# Patient Record
Sex: MALE | NOT HISPANIC OR LATINO | RURAL
[De-identification: names, ages, dates, MRNs, and addresses within clinical notes are randomized per-mention and may not be internally consistent; named-entity substitution may affect disease eponyms.]

---

## 2022-04-05 ENCOUNTER — OFFICE VISIT (OUTPATIENT)
Dept: CARDIOLOGY | Facility: CLINIC | Age: 77
End: 2022-04-05

## 2022-04-05 VITALS
HEIGHT: 71 IN | BODY MASS INDEX: 21.28 KG/M2 | SYSTOLIC BLOOD PRESSURE: 182 MMHG | OXYGEN SATURATION: 98 % | HEART RATE: 65 BPM | WEIGHT: 152 LBS | DIASTOLIC BLOOD PRESSURE: 81 MMHG

## 2022-04-05 DIAGNOSIS — I10 ESSENTIAL HYPERTENSION: ICD-10-CM

## 2022-04-05 DIAGNOSIS — E78.5 DYSLIPIDEMIA: ICD-10-CM

## 2022-04-05 DIAGNOSIS — I25.700 CORONARY ARTERY DISEASE INVOLVING CORONARY BYPASS GRAFT OF NATIVE HEART WITH UNSTABLE ANGINA PECTORIS: Primary | ICD-10-CM

## 2022-04-05 PROCEDURE — 99204 OFFICE O/P NEW MOD 45 MIN: CPT | Performed by: INTERNAL MEDICINE

## 2022-04-05 PROCEDURE — 93000 ELECTROCARDIOGRAM COMPLETE: CPT | Performed by: INTERNAL MEDICINE

## 2022-04-05 RX ORDER — ISOSORBIDE MONONITRATE 30 MG/1
30 TABLET, EXTENDED RELEASE ORAL DAILY
COMMUNITY

## 2022-04-05 RX ORDER — AMLODIPINE BESYLATE 5 MG/1
5 TABLET ORAL DAILY
Qty: 90 TABLET | Refills: 3 | Status: SHIPPED | OUTPATIENT
Start: 2022-04-05 | End: 2022-09-13 | Stop reason: SDUPTHER

## 2022-04-05 RX ORDER — ASPIRIN 81 MG/1
81 TABLET ORAL DAILY
COMMUNITY

## 2022-04-05 NOTE — H&P (VIEW-ONLY)
[facility]    Encounter Date: 2022    Patient ID: Star Izquierdo is a 76 y.o. male.  : 1945     PCP: Provider, No Known       Chief Complaint: Chest Pain and Shortness of Breath      PROBLEM LIST:  1. Coronary artery disease  a. CABG x1, 2012: LIMA to LAD.   b. Stress echo, 2018 ([facility]): EF 55%. Stress ECG consistent with myocardial ischemia. Max exercise 9 min, max ST deviation 2 mm, angina present but did not limit exercise. Peak stress - hypokinesis of inferior and inferoseptal LV myocardium.   c. LHC, 2018 ([facility]): EF 60%. Two-vessel obstructive CAD of the LAD. PCI of OM2 with Synergy DEISY 2.25 x 32 mm from the proximal vessel into the secondary B branch, crossing secondary A branch. Continued patency of the LIMA-LAD graft. Proximal LAD ostial 80% heavily calcified on a moderate bend, before the diagonal, which has focal 90% stenosis, small caliber 2 mm. Middle LAD with 70% diffuse disease with competitive flow from patent LIMA graft. Middle RCA with previously placed stents mild diffuse disease, with focal area up to 50%, FFR 0.84.  LVEDP 11 mmHg. Normal valve function.   2. Hypertension  3. Hyperlipidemia  4. History of tobacco abuse  5. GERD  6. Prostate cancer status post prostatectomy  7. Surgical history  a. CABG  b. Proctectomy  c. Appendectomy 2021  History of Present Illness: Star Izquierdo is a 76 y.o. male who has a history of coronary artery disease he is s/p CABG WITH BRINK to LAD () and PCI with OM2 stent (), at the time of PCI his LIMA graft was patent.  Additionally has a history of hypertension, hyperlipidemia, and GERD.  The patient presents to the cardiology office today with complaint of progressive exertional chest pain/pressure for the past three months. At the time of his last heart catheterization in 2018 he was informed he had an additional blockage that would be treated medically. He is not  "currently on any antihypertensives or antianginal and says his previous cardiologist discontinued them. He hasn't tolerated cholesterol medication in the past due to myalgias and is not on anything.  States that he has taken \"almost all of them\".  He reports a week ago he abruptly woke up from sleep seeing bright white lights but did not experience any chest pain or palpitations.. He stopped smoking tobacco in . He denies history of diabetes mellitus. Patient denies shortness of breath, palpitations, edema, dizziness, and syncope.     Past Medical History:   Past Medical History:   Diagnosis Date   • Acid reflux    • Anxiety    • CHF (congestive heart failure) (HCC)    • Depression    • Myocardial infarction (HCC)    • Prostate cancer (HCC)        Past Surgical History:   Past Surgical History:   Procedure Laterality Date   • APPENDECTOMY  2021   • HERNIA REPAIR     • PROSTATE SURGERY         Family History:   Family History   Problem Relation Age of Onset   • Stroke Mother    • No Known Problems Sister    • No Known Problems Sister    • No Known Problems Brother        Social History:   Social History     Socioeconomic History   • Marital status: Unknown   Tobacco Use   • Smoking status: Former Smoker     Quit date:      Years since quittin.2   • Smokeless tobacco: Never Used   Vaping Use   • Vaping Use: Never used   Substance and Sexual Activity   • Alcohol use: Never   • Drug use: Never   • Sexual activity: Defer       Tobacco History:   Social History     Tobacco Use   Smoking Status Former Smoker   • Quit date:    • Years since quittin.2   Smokeless Tobacco Never Used       Medications:     Current Outpatient Medications:   •  aspirin 81 MG EC tablet, Take 81 mg by mouth Daily., Disp: , Rfl:   •  Calcium Carbonate-Vitamin D (calcium-vitamin D) 500-200 MG-UNIT tablet per tablet, Take 1 tablet by mouth Daily., Disp: , Rfl:   •  cyanocobalamin (VITAMIN B-12) 1000 MCG tablet, Take 1,000 mcg " "by mouth Daily., Disp: , Rfl:   •  isosorbide mononitrate (IMDUR) 30 MG 24 hr tablet, Take 30 mg by mouth Daily., Disp: , Rfl:   •  amLODIPine (NORVASC) 5 MG tablet, Take 1 tablet by mouth Daily., Disp: 90 tablet, Rfl: 3    Allergies:   Allergies   Allergen Reactions   • Penicillins Hives     The following portions of the patient's history were reviewed and updated as appropriate: allergies, current medications, past family history, past medical history, past social history, past surgical history and problem list.    Review of Systems   Constitution: Negative for chills, fever, fatigue, generalized weakness.   Cardiovascular: Negative for dyspnea on exertion, leg swelling, palpitations, orthopnea, and syncope. Positive for chest pain  Respiratory: Negative for cough, shortness of breath, and wheezing.  HENT: Negative for ear pain, nosebleeds, and tinnitus.  Gastrointestinal: Negative for abdominal pain, constipation, diarrhea, nausea and vomiting.   Genitourinary: No urinary symptoms.  Musculoskeletal: Negative for muscle cramps.  Neurological: Negative for dizziness, headaches, loss of balance, numbness, and symptoms of stroke.  Psychiatric: Normal mental status.     All other systems reviewed and are negative.        Objective:     BP (!) 182/81 (BP Location: Right arm, Patient Position: Sitting)   Pulse 65   Ht 180.3 cm (71\")   Wt 68.9 kg (152 lb)   SpO2 98%   BMI 21.20 kg/m²    BP repeat: 160/90    Physical Exam  Constitutional: Patient appears well-developed and well-nourished.   HENT: HEENT exam unremarkable.   Neck: Neck supple. No JVD present. No carotid bruits.   Cardiovascular: Normal rate, regular rhythm and normal heart sounds. No murmur heard.   2+ symmetric pulses.   Pulmonary/Chest: Breath sounds normal. Does not exhibit tenderness.   Abdominal: Abdomen benign.   Musculoskeletal: Does not exhibit edema.   Neurological: Neurological exam unremarkable.   Vitals reviewed.    Data Review:   Lab " Review 2/2022  FLP: , , HDL 38,   CMP: Glu 97, BUN 19, Creat 1.01, eGFR 71, Na 141, K 4.5, Cl 106, CO2 27, Ca 9.4, Alk Phos 110, AST 25, ALT 14   CBC: WBC 7.6, RBC 6.25, HGB 12.5, HCT 38.1, MCV 89.6, MCH 29.5,         ECG 12 Lead    Date/Time: 4/5/2022 2:46 PM  Performed by: Esteban Russell MD  Authorized by: Esteban Russell MD   Comparison: not compared with previous ECG   Rhythm: sinus rhythm  BPM: 65  Other findings comments: moderate voltage for LVH    Clinical impression: abnormal EKG               Assessment:      Diagnosis Plan   1. Coronary artery disease involving coronary bypass graft of native heart with unstable angina pectoris (HCC)   due to progressive angina with features of unstable angina in the setting of known CAD and unmodified risk factors there is significant risk of disease progression.  I have recommended cardiac catheterization study for further assessment.  This is to be followed by catheter-based intervention depending on findings.  The procedure will be scheduled in the near future.  Continue aspirin and Imdur for now, further add amlodipine as an additional antianginal.   2. Essential hypertension  Inadequate control; begin amlodipine 5 mg daily   3. Dyslipidemia  Inadequate control, last  but will readdress after LHC and may consider next visit or PCSK9 inhibitor.     Plan:   LHC to be scheduled for progressive/unstable angina type symptoms.      Begin amlodipine 5 mg daily for improved control of blood pressure and angina, continue aspirin and Imdur.   Will readdress dyslipidemia after heart cath and consider starting next visit of PCSK9 inhibitor.   Continue to stop the current medications.   FU after procedure, sooner as needed.  Thank you for allowing us to participate in the care of your patient.     Scribed for Esteban Russell MD by Freida Healy. 4/7/2022 14:50 EDT    I, Esteban Russell MD, personally performed the services described in this documentation  as scribed by the above named individual in my presence, and it is both accurate and complete.  4/7/2022  07:48 EDT    Part of this note may be an electronic transcription/translation of spoken language to printed text using the Dragon Dictation System.

## 2022-04-05 NOTE — PROGRESS NOTES
[facility] Card[facility]    Encounter Date: 2022    Patient ID: Star Izquierdo is a 76 y.o. male.  : 1945     PCP: Provider, No Known       Chief Complaint: Chest Pain and Shortness of Breath      PROBLEM LIST:  1. Coronary artery disease  a. CABG x1, 2012: LIMA to LAD.   b. Stress echo, 2018 ([facility]): EF 55%. Stress ECG consistent with myocardial ischemia. Max exercise 9 min, max ST deviation 2 mm, angina present but did not limit exercise. Peak stress - hypokinesis of inferior and inferoseptal LV myocardium.   c. LHC, 2018 ([facility]): EF 60%. Two-vessel obstructive CAD of the LAD. PCI of OM2 with Synergy DEISY 2.25 x 32 mm from the proximal vessel into the secondary B branch, crossing secondary A branch. Continued patency of the LIMA-LAD graft. Proximal LAD ostial 80% heavily calcified on a moderate bend, before the diagonal, which has focal 90% stenosis, small caliber 2 mm. Middle LAD with 70% diffuse disease with competitive flow from patent LIMA graft. Middle RCA with previously placed stents mild diffuse disease, with focal area up to 50%, FFR 0.84.  LVEDP 11 mmHg. Normal valve function.   2. Hypertension  3. Hyperlipidemia  4. History of tobacco abuse  5. GERD  6. Prostate cancer status post prostatectomy  7. Surgical history  a. CABG  b. Proctectomy  c. Appendectomy 2021  History of Present Illness: Star Izquierdo is a 76 y.o. male who has a history of coronary artery disease he is s/p CABG WITH BRINK to LAD () and PCI with OM2 stent (), at the time of PCI his LIMA graft was patent.  Additionally has a history of hypertension, hyperlipidemia, and GERD.  The patient presents to the cardiology office today with complaint of progressive exertional chest pain/pressure for the past three months. At the time of his last heart catheterization in 2018 he was informed he had an additional blockage that would be treated medically. He is not  "currently on any antihypertensives or antianginal and says his previous cardiologist discontinued them. He hasn't tolerated cholesterol medication in the past due to myalgias and is not on anything.  States that he has taken \"almost all of them\".  He reports a week ago he abruptly woke up from sleep seeing bright white lights but did not experience any chest pain or palpitations.. He stopped smoking tobacco in . He denies history of diabetes mellitus. Patient denies shortness of breath, palpitations, edema, dizziness, and syncope.     Past Medical History:   Past Medical History:   Diagnosis Date   • Acid reflux    • Anxiety    • CHF (congestive heart failure) (HCC)    • Depression    • Myocardial infarction (HCC)    • Prostate cancer (HCC)        Past Surgical History:   Past Surgical History:   Procedure Laterality Date   • APPENDECTOMY  2021   • HERNIA REPAIR     • PROSTATE SURGERY         Family History:   Family History   Problem Relation Age of Onset   • Stroke Mother    • No Known Problems Sister    • No Known Problems Sister    • No Known Problems Brother        Social History:   Social History     Socioeconomic History   • Marital status: Unknown   Tobacco Use   • Smoking status: Former Smoker     Quit date:      Years since quittin.2   • Smokeless tobacco: Never Used   Vaping Use   • Vaping Use: Never used   Substance and Sexual Activity   • Alcohol use: Never   • Drug use: Never   • Sexual activity: Defer       Tobacco History:   Social History     Tobacco Use   Smoking Status Former Smoker   • Quit date:    • Years since quittin.2   Smokeless Tobacco Never Used       Medications:     Current Outpatient Medications:   •  aspirin 81 MG EC tablet, Take 81 mg by mouth Daily., Disp: , Rfl:   •  Calcium Carbonate-Vitamin D (calcium-vitamin D) 500-200 MG-UNIT tablet per tablet, Take 1 tablet by mouth Daily., Disp: , Rfl:   •  cyanocobalamin (VITAMIN B-12) 1000 MCG tablet, Take 1,000 mcg " "by mouth Daily., Disp: , Rfl:   •  isosorbide mononitrate (IMDUR) 30 MG 24 hr tablet, Take 30 mg by mouth Daily., Disp: , Rfl:   •  amLODIPine (NORVASC) 5 MG tablet, Take 1 tablet by mouth Daily., Disp: 90 tablet, Rfl: 3    Allergies:   Allergies   Allergen Reactions   • Penicillins Hives     The following portions of the patient's history were reviewed and updated as appropriate: allergies, current medications, past family history, past medical history, past social history, past surgical history and problem list.    Review of Systems   Constitution: Negative for chills, fever, fatigue, generalized weakness.   Cardiovascular: Negative for dyspnea on exertion, leg swelling, palpitations, orthopnea, and syncope. Positive for chest pain  Respiratory: Negative for cough, shortness of breath, and wheezing.  HENT: Negative for ear pain, nosebleeds, and tinnitus.  Gastrointestinal: Negative for abdominal pain, constipation, diarrhea, nausea and vomiting.   Genitourinary: No urinary symptoms.  Musculoskeletal: Negative for muscle cramps.  Neurological: Negative for dizziness, headaches, loss of balance, numbness, and symptoms of stroke.  Psychiatric: Normal mental status.     All other systems reviewed and are negative.        Objective:     BP (!) 182/81 (BP Location: Right arm, Patient Position: Sitting)   Pulse 65   Ht 180.3 cm (71\")   Wt 68.9 kg (152 lb)   SpO2 98%   BMI 21.20 kg/m²    BP repeat: 160/90    Physical Exam  Constitutional: Patient appears well-developed and well-nourished.   HENT: HEENT exam unremarkable.   Neck: Neck supple. No JVD present. No carotid bruits.   Cardiovascular: Normal rate, regular rhythm and normal heart sounds. No murmur heard.   2+ symmetric pulses.   Pulmonary/Chest: Breath sounds normal. Does not exhibit tenderness.   Abdominal: Abdomen benign.   Musculoskeletal: Does not exhibit edema.   Neurological: Neurological exam unremarkable.   Vitals reviewed.    Data Review:   Lab " Review 2/2022  FLP: , , HDL 38,   CMP: Glu 97, BUN 19, Creat 1.01, eGFR 71, Na 141, K 4.5, Cl 106, CO2 27, Ca 9.4, Alk Phos 110, AST 25, ALT 14   CBC: WBC 7.6, RBC 6.25, HGB 12.5, HCT 38.1, MCV 89.6, MCH 29.5,         ECG 12 Lead    Date/Time: 4/5/2022 2:46 PM  Performed by: Esteban Russell MD  Authorized by: Esteban Russell MD   Comparison: not compared with previous ECG   Rhythm: sinus rhythm  BPM: 65  Other findings comments: moderate voltage for LVH    Clinical impression: abnormal EKG               Assessment:      Diagnosis Plan   1. Coronary artery disease involving coronary bypass graft of native heart with unstable angina pectoris (HCC)   due to progressive angina with features of unstable angina in the setting of known CAD and unmodified risk factors there is significant risk of disease progression.  I have recommended cardiac catheterization study for further assessment.  This is to be followed by catheter-based intervention depending on findings.  The procedure will be scheduled in the near future.  Continue aspirin and Imdur for now, further add amlodipine as an additional antianginal.   2. Essential hypertension  Inadequate control; begin amlodipine 5 mg daily   3. Dyslipidemia  Inadequate control, last  but will readdress after LHC and may consider next visit or PCSK9 inhibitor.     Plan:   LHC to be scheduled for progressive/unstable angina type symptoms.      Begin amlodipine 5 mg daily for improved control of blood pressure and angina, continue aspirin and Imdur.   Will readdress dyslipidemia after heart cath and consider starting next visit of PCSK9 inhibitor.   Continue to stop the current medications.   FU after procedure, sooner as needed.  Thank you for allowing us to participate in the care of your patient.     Scribed for Esteban Russell MD by Freida Healy. 4/7/2022 14:50 EDT    I, Esteban Russell MD, personally performed the services described in this documentation  as scribed by the above named individual in my presence, and it is both accurate and complete.  4/7/2022  07:48 EDT    Part of this note may be an electronic transcription/translation of spoken language to printed text using the Dragon Dictation System.

## 2022-04-07 PROBLEM — I10 ESSENTIAL HYPERTENSION: Status: ACTIVE | Noted: 2022-04-07

## 2022-04-07 PROBLEM — I25.700 CORONARY ARTERY DISEASE INVOLVING CORONARY BYPASS GRAFT OF NATIVE HEART WITH UNSTABLE ANGINA PECTORIS: Status: ACTIVE | Noted: 2022-04-07

## 2022-04-07 PROBLEM — E78.5 DYSLIPIDEMIA: Status: ACTIVE | Noted: 2022-04-07

## 2022-04-11 ENCOUNTER — PREP FOR SURGERY (OUTPATIENT)
Dept: OTHER | Facility: HOSPITAL | Age: 77
End: 2022-04-11

## 2022-04-11 RX ORDER — SODIUM CHLORIDE 0.9 % (FLUSH) 0.9 %
10 SYRINGE (ML) INJECTION EVERY 12 HOURS SCHEDULED
Status: CANCELLED | OUTPATIENT
Start: 2022-04-11

## 2022-04-11 RX ORDER — SODIUM CHLORIDE 0.9 % (FLUSH) 0.9 %
10 SYRINGE (ML) INJECTION AS NEEDED
Status: CANCELLED | OUTPATIENT
Start: 2022-04-11

## 2022-04-11 RX ORDER — ASPIRIN 81 MG/1
324 TABLET, CHEWABLE ORAL ONCE
Status: CANCELLED | OUTPATIENT
Start: 2022-04-11 | End: 2022-04-11

## 2022-04-11 RX ORDER — ASPIRIN 81 MG/1
81 TABLET ORAL DAILY
Status: CANCELLED | OUTPATIENT
Start: 2022-04-12

## 2022-04-13 ENCOUNTER — HOSPITAL ENCOUNTER (OUTPATIENT)
Facility: HOSPITAL | Age: 77
Setting detail: HOSPITAL OUTPATIENT SURGERY
Discharge: HOME | End: 2022-04-13
Attending: INTERNAL MEDICINE | Admitting: INTERNAL MEDICINE

## 2022-04-13 VITALS
HEIGHT: 71 IN | RESPIRATION RATE: 16 BRPM | DIASTOLIC BLOOD PRESSURE: 93 MMHG | HEART RATE: 59 BPM | BODY MASS INDEX: 21.3 KG/M2 | OXYGEN SATURATION: 97 % | SYSTOLIC BLOOD PRESSURE: 137 MMHG | TEMPERATURE: 97.4 F | WEIGHT: 152.12 LBS

## 2022-04-13 DIAGNOSIS — I25.700 CORONARY ARTERY DISEASE INVOLVING CORONARY BYPASS GRAFT OF NATIVE HEART WITH UNSTABLE ANGINA PECTORIS: ICD-10-CM

## 2022-04-13 DIAGNOSIS — I10 ESSENTIAL HYPERTENSION: ICD-10-CM

## 2022-04-13 DIAGNOSIS — E78.5 DYSLIPIDEMIA: ICD-10-CM

## 2022-04-13 LAB
ACT BLD: 267 SECONDS (ref 82–152)
ALBUMIN SERPL-MCNC: 4 G/DL (ref 3.5–5.2)
ALBUMIN/GLOB SERPL: 1.3 G/DL
ALP SERPL-CCNC: 103 U/L (ref 39–117)
ALT SERPL W P-5'-P-CCNC: 7 U/L (ref 1–41)
ANION GAP SERPL CALCULATED.3IONS-SCNC: 10 MMOL/L (ref 5–15)
AST SERPL-CCNC: 14 U/L (ref 1–40)
BILIRUB SERPL-MCNC: 0.4 MG/DL (ref 0–1.2)
BUN SERPL-MCNC: 12 MG/DL (ref 8–23)
BUN/CREAT SERPL: 10.8 (ref 7–25)
CALCIUM SPEC-SCNC: 8.8 MG/DL (ref 8.6–10.5)
CHLORIDE SERPL-SCNC: 103 MMOL/L (ref 98–107)
CHOLEST SERPL-MCNC: 175 MG/DL (ref 0–200)
CO2 SERPL-SCNC: 26 MMOL/L (ref 22–29)
CREAT BLDA-MCNC: 1.2 MG/DL (ref 0.6–1.3)
CREAT SERPL-MCNC: 1.11 MG/DL (ref 0.76–1.27)
DEPRECATED RDW RBC AUTO: 44.6 FL (ref 37–54)
EGFRCR SERPLBLD CKD-EPI 2021: 68 ML/MIN/1.73
ERYTHROCYTE [DISTWIDTH] IN BLOOD BY AUTOMATED COUNT: 13.3 % (ref 12.3–15.4)
GLOBULIN UR ELPH-MCNC: 3 GM/DL
GLUCOSE SERPL-MCNC: 99 MG/DL (ref 65–99)
HBA1C MFR BLD: 5.7 % (ref 4.8–5.6)
HCT VFR BLD AUTO: 39.1 % (ref 37.5–51)
HDLC SERPL-MCNC: 48 MG/DL (ref 40–60)
HGB BLD-MCNC: 12.4 G/DL (ref 13–17.7)
LDLC SERPL CALC-MCNC: 108 MG/DL (ref 0–100)
LDLC/HDLC SERPL: 2.21 {RATIO}
MCH RBC QN AUTO: 29.5 PG (ref 26.6–33)
MCHC RBC AUTO-ENTMCNC: 31.7 G/DL (ref 31.5–35.7)
MCV RBC AUTO: 92.9 FL (ref 79–97)
PLATELET # BLD AUTO: 291 10*3/MM3 (ref 140–450)
PMV BLD AUTO: 9.8 FL (ref 6–12)
POTASSIUM SERPL-SCNC: 3.8 MMOL/L (ref 3.5–5.2)
PROT SERPL-MCNC: 7 G/DL (ref 6–8.5)
RBC # BLD AUTO: 4.21 10*6/MM3 (ref 4.14–5.8)
SODIUM SERPL-SCNC: 139 MMOL/L (ref 136–145)
TRIGL SERPL-MCNC: 105 MG/DL (ref 0–150)
VLDLC SERPL-MCNC: 19 MG/DL (ref 5–40)
WBC NRBC COR # BLD: 7.3 10*3/MM3 (ref 3.4–10.8)

## 2022-04-13 PROCEDURE — 93458 L HRT ARTERY/VENTRICLE ANGIO: CPT | Performed by: INTERNAL MEDICINE

## 2022-04-13 PROCEDURE — 25010000002 HEPARIN (PORCINE) PER 1000 UNITS: Performed by: INTERNAL MEDICINE

## 2022-04-13 PROCEDURE — 25010000002 FENTANYL CITRATE (PF) 50 MCG/ML SOLUTION: Performed by: INTERNAL MEDICINE

## 2022-04-13 PROCEDURE — C1874 STENT, COATED/COV W/DEL SYS: HCPCS | Performed by: INTERNAL MEDICINE

## 2022-04-13 PROCEDURE — 82565 ASSAY OF CREATININE: CPT

## 2022-04-13 PROCEDURE — 83036 HEMOGLOBIN GLYCOSYLATED A1C: CPT

## 2022-04-13 PROCEDURE — 93005 ELECTROCARDIOGRAM TRACING: CPT | Performed by: INTERNAL MEDICINE

## 2022-04-13 PROCEDURE — C1894 INTRO/SHEATH, NON-LASER: HCPCS | Performed by: INTERNAL MEDICINE

## 2022-04-13 PROCEDURE — C9600 PERC DRUG-EL COR STENT SING: HCPCS | Performed by: INTERNAL MEDICINE

## 2022-04-13 PROCEDURE — 80061 LIPID PANEL: CPT

## 2022-04-13 PROCEDURE — C1725 CATH, TRANSLUMIN NON-LASER: HCPCS | Performed by: INTERNAL MEDICINE

## 2022-04-13 PROCEDURE — C1769 GUIDE WIRE: HCPCS | Performed by: INTERNAL MEDICINE

## 2022-04-13 PROCEDURE — 85347 COAGULATION TIME ACTIVATED: CPT

## 2022-04-13 PROCEDURE — 25010000002 MIDAZOLAM PER 1 MG: Performed by: INTERNAL MEDICINE

## 2022-04-13 PROCEDURE — C1887 CATHETER, GUIDING: HCPCS | Performed by: INTERNAL MEDICINE

## 2022-04-13 PROCEDURE — 92928 PRQ TCAT PLMT NTRAC ST 1 LES: CPT | Performed by: INTERNAL MEDICINE

## 2022-04-13 PROCEDURE — 0 IOPAMIDOL PER 1 ML: Performed by: INTERNAL MEDICINE

## 2022-04-13 PROCEDURE — 85027 COMPLETE CBC AUTOMATED: CPT

## 2022-04-13 PROCEDURE — 80053 COMPREHEN METABOLIC PANEL: CPT

## 2022-04-13 PROCEDURE — 93459 L HRT ART/GRFT ANGIO: CPT | Performed by: INTERNAL MEDICINE

## 2022-04-13 DEVICE — XIENCE SKYPOINT™ EVEROLIMUS ELUTING CORONARY STENT SYSTEM 3.00 MM X 38 MM / RAPID-EXCHANGE
Type: IMPLANTABLE DEVICE | Status: FUNCTIONAL
Brand: XIENCE SKYPOINT™

## 2022-04-13 RX ORDER — ASPIRIN 81 MG/1
81 TABLET ORAL DAILY
Status: DISCONTINUED | OUTPATIENT
Start: 2022-04-14 | End: 2022-04-13 | Stop reason: HOSPADM

## 2022-04-13 RX ORDER — SODIUM CHLORIDE 0.9 % (FLUSH) 0.9 %
10 SYRINGE (ML) INJECTION AS NEEDED
Status: DISCONTINUED | OUTPATIENT
Start: 2022-04-13 | End: 2022-04-13 | Stop reason: HOSPADM

## 2022-04-13 RX ORDER — CLOPIDOGREL BISULFATE 75 MG/1
75 TABLET ORAL DAILY
Qty: 90 TABLET | Refills: 3 | Status: SHIPPED | OUTPATIENT
Start: 2022-04-13

## 2022-04-13 RX ORDER — CLOPIDOGREL BISULFATE 75 MG/1
TABLET ORAL AS NEEDED
Status: DISCONTINUED | OUTPATIENT
Start: 2022-04-13 | End: 2022-04-13 | Stop reason: HOSPADM

## 2022-04-13 RX ORDER — SODIUM CHLORIDE 0.9 % (FLUSH) 0.9 %
10 SYRINGE (ML) INJECTION EVERY 12 HOURS SCHEDULED
Status: DISCONTINUED | OUTPATIENT
Start: 2022-04-13 | End: 2022-04-13 | Stop reason: HOSPADM

## 2022-04-13 RX ORDER — SODIUM CHLORIDE 9 MG/ML
3 INJECTION, SOLUTION INTRAVENOUS CONTINUOUS
Status: ACTIVE | OUTPATIENT
Start: 2022-04-13 | End: 2022-04-13

## 2022-04-13 RX ORDER — CLOPIDOGREL BISULFATE 75 MG/1
75 TABLET ORAL DAILY
Qty: 90 TABLET | Refills: 3 | Status: SHIPPED | OUTPATIENT
Start: 2022-04-13 | End: 2022-04-13 | Stop reason: SDUPTHER

## 2022-04-13 RX ORDER — ROSUVASTATIN CALCIUM 10 MG/1
10 TABLET, COATED ORAL DAILY
Qty: 90 TABLET | Refills: 3 | Status: SHIPPED | OUTPATIENT
Start: 2022-04-13 | End: 2022-09-13

## 2022-04-13 RX ORDER — ASPIRIN 81 MG/1
324 TABLET, CHEWABLE ORAL ONCE
Status: COMPLETED | OUTPATIENT
Start: 2022-04-13 | End: 2022-04-13

## 2022-04-13 RX ORDER — LIDOCAINE HYDROCHLORIDE 10 MG/ML
INJECTION, SOLUTION EPIDURAL; INFILTRATION; INTRACAUDAL; PERINEURAL AS NEEDED
Status: DISCONTINUED | OUTPATIENT
Start: 2022-04-13 | End: 2022-04-13 | Stop reason: HOSPADM

## 2022-04-13 RX ORDER — FENTANYL CITRATE 50 UG/ML
INJECTION, SOLUTION INTRAMUSCULAR; INTRAVENOUS AS NEEDED
Status: DISCONTINUED | OUTPATIENT
Start: 2022-04-13 | End: 2022-04-13 | Stop reason: HOSPADM

## 2022-04-13 RX ORDER — MIDAZOLAM HYDROCHLORIDE 1 MG/ML
INJECTION INTRAMUSCULAR; INTRAVENOUS AS NEEDED
Status: DISCONTINUED | OUTPATIENT
Start: 2022-04-13 | End: 2022-04-13 | Stop reason: HOSPADM

## 2022-04-13 RX ORDER — SODIUM CHLORIDE 9 MG/ML
100 INJECTION, SOLUTION INTRAVENOUS CONTINUOUS
Status: ACTIVE | OUTPATIENT
Start: 2022-04-13 | End: 2022-04-13

## 2022-04-13 RX ORDER — HEPARIN SODIUM 1000 [USP'U]/ML
INJECTION, SOLUTION INTRAVENOUS; SUBCUTANEOUS AS NEEDED
Status: DISCONTINUED | OUTPATIENT
Start: 2022-04-13 | End: 2022-04-13 | Stop reason: HOSPADM

## 2022-04-13 RX ORDER — ACETAMINOPHEN 325 MG/1
650 TABLET ORAL EVERY 4 HOURS PRN
Status: DISCONTINUED | OUTPATIENT
Start: 2022-04-13 | End: 2022-04-13 | Stop reason: HOSPADM

## 2022-04-13 RX ADMIN — ACETAMINOPHEN 650 MG: 325 TABLET ORAL at 12:05

## 2022-04-13 RX ADMIN — SODIUM CHLORIDE 3 ML/KG/HR: 9 INJECTION, SOLUTION INTRAVENOUS at 08:02

## 2022-04-13 RX ADMIN — ASPIRIN 81 MG 324 MG: 81 TABLET ORAL at 08:01

## 2022-04-13 NOTE — INTERVAL H&P NOTE
H&P reviewed. The patient was examined and there are no changes to the H&P.      Allens Test  Right: Abnormal  Left: Abnormal    Vitals:    04/13/22 0722   BP: 169/72   Pulse: 58     Lab Results   Component Value Date    WBC 7.30 04/13/2022    HGB 12.4 (L) 04/13/2022    HCT 39.1 04/13/2022    MCV 92.9 04/13/2022     04/13/2022     Lab Results   Component Value Date    CREATININE 1.20 04/13/2022       Patient is a 76-year-old male with past medical history significant for coronary artery disease status post CABG in 2012 with PCI in 2018.  Patient has been having progressive exertional chest pain/pressure for the last 3 months and presents today for left heart catheterization.  Left and right Allens tests abnormal.     Jodi Scott PA-C

## 2022-04-14 ENCOUNTER — DOCUMENTATION (OUTPATIENT)
Dept: CARDIAC REHAB | Facility: HOSPITAL | Age: 77
End: 2022-04-14

## 2022-04-14 ENCOUNTER — CALL CENTER PROGRAMS (OUTPATIENT)
Dept: CALL CENTER | Facility: HOSPITAL | Age: 77
End: 2022-04-14

## 2022-04-14 NOTE — OUTREACH NOTE
PCI/Device Survey    Flowsheet Row Responses   Facility patient discharged from? [place]   Procedure date 04/13/22   Procedure (if device, specify in description) PCI   PCI site Left, Arm  [wrist]   Performing MD Dr. Esteban Russell   Attempt successful? Yes   Call start time [id number]   Call end time 0854   Has the patient had any of the following symptoms since discharge? --  [None noted]   Is the patient taking prescribed medications: Plavix, ASA   Does the patient have any of the following symptoms related to the cath/surgical site? Bruising   Does the patient have an appointment scheduled with the cardiologist? Yes   Appointment comments Appt with Dr. Russell is on 6/7/22   If the patient is a current smoker, are they able to teach back resources for cessation? Not a smoker   Did the patient feel prepared to go home on the same day as the procedure? Yes   Is the patient satisfied with the same day discharge process? Yes   PCI/Device call completed Yes          SUSI CRAMER - Registered Nurse

## 2022-04-14 NOTE — PROGRESS NOTES
Cardiac Rehab staff mailed referral letter to patient regarding Phase II Cardiac Rehab program. Instruction for patient to contact [facility] Cardiac Rehab Department for additional program information and to forward referral to closest facility.

## 2022-06-05 LAB
QT INTERVAL: 416 MS
QTC INTERVAL: 429 MS

## 2022-09-13 ENCOUNTER — OFFICE VISIT (OUTPATIENT)
Dept: CARDIOLOGY | Facility: CLINIC | Age: 77
End: 2022-09-13

## 2022-09-13 VITALS
WEIGHT: 149 LBS | HEIGHT: 71 IN | OXYGEN SATURATION: 97 % | HEART RATE: 67 BPM | BODY MASS INDEX: 20.86 KG/M2 | DIASTOLIC BLOOD PRESSURE: 85 MMHG | SYSTOLIC BLOOD PRESSURE: 181 MMHG

## 2022-09-13 DIAGNOSIS — I25.810 CORONARY ARTERY DISEASE INVOLVING CORONARY BYPASS GRAFT OF NATIVE HEART WITHOUT ANGINA PECTORIS: Primary | ICD-10-CM

## 2022-09-13 DIAGNOSIS — I10 ESSENTIAL HYPERTENSION: ICD-10-CM

## 2022-09-13 DIAGNOSIS — E78.5 DYSLIPIDEMIA: ICD-10-CM

## 2022-09-13 PROCEDURE — 99214 OFFICE O/P EST MOD 30 MIN: CPT | Performed by: INTERNAL MEDICINE

## 2022-09-13 RX ORDER — AMLODIPINE BESYLATE 10 MG/1
10 TABLET ORAL DAILY
Qty: 90 TABLET | Refills: 3 | Status: SHIPPED | OUTPATIENT
Start: 2022-09-13

## 2022-09-13 NOTE — PROGRESS NOTES
[facility] Cardiology    Encounter Date: 2022    Patient ID: Star Izquierdo is a 76 y.o. male.  : 1945     PCP: Provider, No Known       Chief Complaint: Coronary Artery Disease      PROBLEM LIST:  1. Coronary artery disease  a. CABG x1, : LIMA to LAD.   b. Stress echo, 2018 ([facility]): EF 55%. Stress ECG consistent with myocardial ischemia. Max exercise 9 min, max ST deviation 2 mm, angina present but did not limit exercise. Peak stress - hypokinesis of inferior and inferoseptal LV myocardium.   c. [facility], 2018 ([facility]): EF 60%. Two-vessel obstructive CAD of the LAD. PCI of OM2 with Synergy DEISY 2.25 x 32 mm from the proximal vessel into the secondary B branch, crossing secondary A branch. Continued patency of the LIMA-LAD graft. Proximal LAD ostial 80% heavily calcified on a moderate bend, before the diagonal, which has focal 90% stenosis, small caliber 2 mm. Middle LAD with 70% diffuse disease with competitive flow from patent LIMA graft. Middle RCA with previously placed stents mild diffuse disease, with focal area up to 50%, FFR 0.84.  LVEDP 11 mmHg. Normal valve function.   d. [facility], 2022: triple vessel disease of the native coronary arteries. Patent LIMA graft to LAD. Successful PTCA/restenting of the RCA with placement of 2 overlapping 3.0 x 38 mm drug-eluting stents reducing up to 80% stenosis to 0%.Residual moderate nonobstructive disease of the small distal branches of the RCA and the medium sized first marginal branch of the circumflex to be managed with medical therapy. EF 60%.  2. Hypertension  3. Hyperlipidemia  a. Statin intolerant   4. History of tobacco abuse  5. GERD  6. Prostate cancer status post prostatectomy  7. Surgical history  a. CABG  b. Proctectomy  c. Appendectomy 2021    History of Present Illness  Patient presents today for a follow-up with a history of coronary artery disease s/p CABG with LIMA to LAD and OM2  stent and cardiac risk factors. Since last visit, patient states that he is feeling better since his left heart catheterization. He states that Crestor is causing myalgia. He mentions he puts flax seed oil on everything he eats. He does monitor his blood pressure at home and reports his blood pressure is about 180 mmHg systolic. Patient denies chest pain, shortness of breath, orthopnea, palpitations, edema, dizziness, and syncope.      Allergies   Allergen Reactions   • Penicillins Hives   • Statins Myalgia         Current Outpatient Medications:   •  amLODIPine (NORVASC) 10 MG tablet, Take 1 tablet by mouth Daily., Disp: 90 tablet, Rfl: 3  •  aspirin 81 MG EC tablet, Take 600 mg by mouth Daily., Disp: , Rfl:   •  Calcium Carbonate-Vitamin D (calcium-vitamin D) 500-200 MG-UNIT tablet per tablet, Take 1 tablet by mouth Daily., Disp: , Rfl:   •  clopidogrel (PLAVIX) 75 MG tablet, Take 1 tablet by mouth Daily., Disp: 90 tablet, Rfl: 3  •  cyanocobalamin (VITAMIN B-12) 1000 MCG tablet, Take 1,000 mcg by mouth Daily., Disp: , Rfl:   •  isosorbide mononitrate (IMDUR) 30 MG 24 hr tablet, Take 30 mg by mouth Daily., Disp: , Rfl:     The following portions of the patient's history were reviewed and updated as appropriate: allergies, current medications, past family history, past medical history, past social history, past surgical history and problem list.    ROS  Review of Systems   Constitution: Negative for chills, fever, fatigue, generalized weakness.   Cardiovascular: Negative for chest pain, dyspnea on exertion, leg swelling, palpitations, orthopnea, and syncope.   Respiratory: Negative for cough, shortness of breath, and wheezing.  HENT: Negative for ear pain, nosebleeds, and tinnitus.  Gastrointestinal: Negative for abdominal pain, constipation, diarrhea, nausea and vomiting.   Genitourinary: No urinary symptoms.  Musculoskeletal: Negative for muscle cramps.  Neurological: Negative for dizziness, headaches, loss of  "balance, numbness, and symptoms of stroke.  Psychiatric: Normal mental status.     All other systems reviewed and are negative.        Objective:     BP (!) 181/85 (BP Location: Right arm, Patient Position: Sitting)   Pulse 67   Ht 180.3 cm (71\")   Wt 67.6 kg (149 lb)   SpO2 97%   BMI 20.78 kg/m²    BP repeat: 178/80    Physical Exam  Constitutional: Patient appears well-developed and well-nourished.   HENT: HEENT exam unremarkable.   Neck: Neck supple. No JVD present. No carotid bruits.   Cardiovascular: Normal rate, regular rhythm and normal heart sounds. No murmur heard.   2+ symmetric pulses.   Pulmonary/Chest: Breath sounds normal. Does not exhibit tenderness.   Abdominal: Abdomen benign.   Musculoskeletal: Does not exhibit edema.   Neurological: Neurological exam unremarkable.   Vitals reviewed.    Data Review:   Lab Results   Component Value Date    GLUCOSE 99 04/13/2022    BUN 12 04/13/2022    CREATININE 1.11 04/13/2022    CREATININE 1.20 04/13/2022    BCR 10.8 04/13/2022    K 3.8 04/13/2022    CO2 26.0 04/13/2022    CALCIUM 8.8 04/13/2022    ALBUMIN 4.00 04/13/2022    AST 14 04/13/2022    ALT 7 04/13/2022     Lab Results   Component Value Date    CHOL 175 04/13/2022    TRIG 105 04/13/2022    HDL 48 04/13/2022     (H) 04/13/2022      Lab Results   Component Value Date    WBC 7.30 04/13/2022    RBC 4.21 04/13/2022    HGB 12.4 (L) 04/13/2022    HCT 39.1 04/13/2022    MCV 92.9 04/13/2022     04/13/2022     Lab Results   Component Value Date    HGBA1C 5.70 (H) 04/13/2022        Procedures       Assessment:      Diagnosis Plan   1. Coronary artery disease involving coronary bypass graft of native heart without angina pectoris (HCC)  Stable without angina on current activity. Continue aspirin 81 mg and Plavix 75 mg daily for DAPT. Continue on Imdur 30 mg daily for chest pain.     2. Essential hypertension  Increased blood pressure in office today. Increase amlodipine from 5 mg to 10 mg to better " control blood pressure.    3. Dyslipidemia  Acceptable control. Patient is statin intolerant. Will continue to manage with diet and exercise.      Plan:   Increase amlodipine from 5 mg to 10 mg to better control blood pressure.   Discontinue rosuvastatin due to myalgia.   Continue current medications.   FU in 6 MO, sooner as needed.  Thank you for allowing us to participate in the care of your patient.       Scribed for Esteban Russell MD by Yara Bronson. 9/13/2022 10:24 EDT         I, Esteban Russell MD, personally performed the services described in this documentation as scribed by the above named individual in my presence, and it is both accurate and complete.  9/20/2022  07:16 EDT      Part of this note may be an electronic transcription/translation of spoken language to printed text using the Dragon Dictation System.

## 2022-12-12 NOTE — PROGRESS NOTES
[facility] Cardiology    Encounter Date: 2022    Patient ID: Star Izquierdo is a 77 y.o. male.  : 1945     PCP: Provider, No Known       Chief Complaint: Coronary artery disease involving coronary bypass graft of       PROBLEM LIST:  1. Coronary artery disease  a. CABG x1, : LIMA to LAD.   b. Stress echo, 2018 ([facility]): EF 55%. Stress ECG consistent with myocardial ischemia. Max exercise 9 min, max ST deviation 2 mm, angina present but did not limit exercise. Peak stress - hypokinesis of inferior and inferoseptal LV myocardium.   c. [facility], 2018 ([facility]): EF 60%. Two-vessel obstructive CAD of the LAD. PCI of OM2 with Synergy DEISY 2.25 x 32 mm from the proximal vessel into the secondary B branch, crossing secondary A branch. Continued patency of the LIMA-LAD graft. Proximal LAD ostial 80% heavily calcified on a moderate bend, before the diagonal, which has focal 90% stenosis, small caliber 2 mm. Middle LAD with 70% diffuse disease with competitive flow from patent LIMA graft. Middle RCA with previously placed stents mild diffuse disease, with focal area up to 50%, FFR 0.84.  LVEDP 11 mmHg. Normal valve function.   d. [facility], 2022: triple vessel disease of the native coronary arteries. Patent LIMA graft to LAD. Successful PTCA/restenting of the RCA with placement of 2 overlapping 3.0 x 38 mm drug-eluting stents reducing up to 80% stenosis to 0%.Residual moderate nonobstructive disease of the small distal branches of the RCA and the medium sized first marginal branch of the circumflex to be managed with medical therapy. EF 60%.  2. Hypertension  3. Hyperlipidemia  a. Statin intolerant   4. History of tobacco abuse  5. GERD  6. Prostate cancer status post prostatectomy  7. Surgical history  a. CABG  b. Proctectomy  c. Appendectomy 2021    History of Present Illness  Patient presents today for a follow-up with a history of coronary artery  "disease s/p CABG with LIMA to LAD and OM2 stent and cardiac risk factors. Since last visit, patient has been doing well from a cardiac standpoint. His BP has been much better controlled with increase in Amlodipine. His systolic has been between 130-150. He has been cutting wood and tolerating this well. He denies chest pain, shortness of breath, palpitations, orthopnea, edema, pre-syncope or syncope.     Allergies   Allergen Reactions   • Penicillins Hives   • Statins Myalgia         Current Outpatient Medications:   •  amLODIPine (NORVASC) 10 MG tablet, Take 1 tablet by mouth Daily., Disp: 90 tablet, Rfl: 3  •  aspirin 81 MG EC tablet, Take 81 mg by mouth Daily., Disp: , Rfl:   •  Calcium Carbonate-Vitamin D (calcium-vitamin D) 500-200 MG-UNIT tablet per tablet, Take 1 tablet by mouth Daily., Disp: , Rfl:   •  clopidogrel (PLAVIX) 75 MG tablet, Take 1 tablet by mouth Daily., Disp: 90 tablet, Rfl: 3  •  cyanocobalamin (VITAMIN B-12) 1000 MCG tablet, Take 1,000 mcg by mouth Daily., Disp: , Rfl:   •  Flaxseed, Linseed, (FLAXSEED OIL PO), Take  by mouth Daily., Disp: , Rfl:   •  isosorbide mononitrate (IMDUR) 30 MG 24 hr tablet, Take 30 mg by mouth Daily., Disp: , Rfl:   •  omeprazole (priLOSEC) 20 MG capsule, Take 20 mg by mouth Daily., Disp: , Rfl:     The following portions of the patient's history were reviewed and updated as appropriate: allergies, current medications, past family history, past medical history, past social history, past surgical history and problem list.    ROS  Review of Systems   14 point ROS negative except for that listed in the HPI.          Objective:     /60 (BP Location: Left arm, Patient Position: Sitting)   Pulse 62   Ht 180.3 cm (71\")   Wt 70.9 kg (156 lb 6.4 oz)   SpO2 98%   BMI 21.81 kg/m²      Physical Exam  Constitutional: Patient appears well-developed and well-nourished.   HENT: HEENT exam unremarkable.   Neck: Neck supple. No JVD present. No carotid bruits. "   Cardiovascular: Normal rate, regular rhythm and normal heart sounds. No murmur heard.   2+ symmetric pulses.   Pulmonary/Chest: Breath sounds normal. Does not exhibit tenderness.   Abdominal: Abdomen benign.   Musculoskeletal: Does not exhibit edema.   Neurological: Neurological exam unremarkable.   Vitals reviewed.    Data Review:   Lab Results   Component Value Date    GLUCOSE 99 04/13/2022    BUN 12 04/13/2022    CREATININE 1.11 04/13/2022    CREATININE 1.20 04/13/2022    BCR 10.8 04/13/2022     04/13/2022    K 3.8 04/13/2022    CO2 26.0 04/13/2022    CALCIUM 8.8 04/13/2022    ALBUMIN 4.00 04/13/2022    AST 14 04/13/2022    ALT 7 04/13/2022     Lab Results   Component Value Date    CHOL 175 04/13/2022    TRIG 105 04/13/2022    HDL 48 04/13/2022     (H) 04/13/2022      Lab Results   Component Value Date    WBC 7.30 04/13/2022    RBC 4.21 04/13/2022    HGB 12.4 (L) 04/13/2022    HCT 39.1 04/13/2022    MCV 92.9 04/13/2022     04/13/2022     No results found for: TSH  Lab Results   Component Value Date    HGBA1C 5.70 (H) 04/13/2022        Procedures             Assessment:      Diagnosis Plan   1. Coronary artery disease involving coronary bypass graft of native heart without angina pectoris  Stable without current angina. Continue aspirin and plavix. Patient is statin intolerant.    2. Essential hypertension  BP improved with increase in amlodipine.    3. Dyslipidemia  LDL above target on labs from 4/2022. Patient is statin intolerant. Continue heart heathy diet.      Plan:   Stable cardiac status. No angina or CHF symptoms.   BP improved with increase in amlodipine.   Continue current medications.   FU in 6 MO, sooner as needed.  Thank you for allowing us to participate in the care of your patient.     Jodi Scott PA-C      Please note that portions of this note may have been completed with a voice recognition program. Efforts were made to edit the dictations, but occasionally words  are mistranscribed.

## 2022-12-13 ENCOUNTER — OFFICE VISIT (OUTPATIENT)
Dept: CARDIOLOGY | Facility: CLINIC | Age: 77
End: 2022-12-13

## 2022-12-13 VITALS
OXYGEN SATURATION: 98 % | BODY MASS INDEX: 21.9 KG/M2 | WEIGHT: 156.4 LBS | DIASTOLIC BLOOD PRESSURE: 60 MMHG | HEIGHT: 71 IN | HEART RATE: 62 BPM | SYSTOLIC BLOOD PRESSURE: 140 MMHG

## 2022-12-13 DIAGNOSIS — I10 ESSENTIAL HYPERTENSION: ICD-10-CM

## 2022-12-13 DIAGNOSIS — E78.5 DYSLIPIDEMIA: ICD-10-CM

## 2022-12-13 DIAGNOSIS — I25.810 CORONARY ARTERY DISEASE INVOLVING CORONARY BYPASS GRAFT OF NATIVE HEART WITHOUT ANGINA PECTORIS: Primary | ICD-10-CM

## 2022-12-13 PROCEDURE — 99214 OFFICE O/P EST MOD 30 MIN: CPT

## 2022-12-13 RX ORDER — OMEPRAZOLE 20 MG/1
20 CAPSULE, DELAYED RELEASE ORAL DAILY
COMMUNITY
Start: 2022-11-18

## 2023-06-14 ENCOUNTER — TELEPHONE (OUTPATIENT)
Dept: CARDIOLOGY | Facility: CLINIC | Age: 78
End: 2023-06-14
Payer: MEDICARE

## 2023-06-14 NOTE — TELEPHONE ENCOUNTER
Esteban Russell MD Quijada, Alejandro, RN  Please notify him that his stress test done in [place] shows good overall circulation and normal cardiac function.  There is a small area of decreased flow along the base of the heart which is in the same area where he has small vessel disease that we are treating with medications.  There is no need for further testing, keep taking the same medications and keep scheduled appointment for follow-up.

## 2023-06-14 NOTE — TELEPHONE ENCOUNTER
Called patient to relay results and recommendations.  Patient verbalized understanding and will call back if he has any questions or concerns.

## 2023-11-28 RX ORDER — AMLODIPINE BESYLATE 10 MG/1
10 TABLET ORAL DAILY
Qty: 90 TABLET | Refills: 2 | Status: SHIPPED | OUTPATIENT
Start: 2023-11-28

## (undated) DEVICE — DEV INFL MONARCH 25W

## (undated) DEVICE — MODEL BT2000 P/N 700287-012KIT CONTENTS: MANIFOLD WITH SALINE AND CONTRAST PORTS, SALINE TUBING WITH SPIKE AND HAND SYRINGE, TRANSDUCER: Brand: BT2000 AUTOMATED MANIFOLD KIT

## (undated) DEVICE — CATH DIAG IMPULSE IMT 5F 100CM

## (undated) DEVICE — MINI TREK CORONARY DILATATION CATHETER 2.0 MM X 12 MM / RAPID-EXCHANGE: Brand: MINI TREK

## (undated) DEVICE — DEV COMP RAD PRELUDESYNC 24CM

## (undated) DEVICE — CATH DIAG EXPO M/ PK 5F FL4/FR4 PIG

## (undated) DEVICE — MODEL AT P65, P/N 701554-001KIT CONTENTS: HAND CONTROLLER, 3-WAY HIGH-PRESSURE STOPCOCK WITH ROTATING END AND PREMIUM HIGH-PRESSURE TUBING: Brand: ANGIOTOUCH® KIT

## (undated) DEVICE — NC TREK CORONARY DILATATION CATHETER 3.0 MM X 15 MM / RAPID-EXCHANGE: Brand: NC TREK

## (undated) DEVICE — INTRO SHEATH PRELUDE IDEAL SPRNG COIL 021 6F 23X80CM

## (undated) DEVICE — CATH DIAG EXPO .045 FL3  5F 100CM

## (undated) DEVICE — GUIDELINER CATHETERS ARE INTENDED TO BE USED IN CONJUNCTION WITH GUIDE CATHETERS TO ACCESS DISCRETE REGIONS OF THE CORONARY AND/OR PERIPHERAL VASCULATURE, AND TO FACILITATE PLACEMENT OF INTERVENTIONAL DEVICES.: Brand: GUIDELINER® V3 CATHETER

## (undated) DEVICE — PK CATH CARD 10

## (undated) DEVICE — GW LUGE .014 182 CM

## (undated) DEVICE — GUIDE CATHETER: Brand: MACH1™